# Patient Record
Sex: FEMALE | Race: WHITE | Employment: UNEMPLOYED | URBAN - METROPOLITAN AREA
[De-identification: names, ages, dates, MRNs, and addresses within clinical notes are randomized per-mention and may not be internally consistent; named-entity substitution may affect disease eponyms.]

---
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
## 2020-01-09 ENCOUNTER — TRANSFERRED RECORDS (OUTPATIENT)
Dept: HEALTH INFORMATION MANAGEMENT | Facility: CLINIC | Age: 20
End: 2020-01-09

## 2020-01-23 ENCOUNTER — TRANSFERRED RECORDS (OUTPATIENT)
Dept: HEALTH INFORMATION MANAGEMENT | Facility: CLINIC | Age: 20
End: 2020-01-23

## 2020-01-31 ENCOUNTER — TRANSCRIBE ORDERS (OUTPATIENT)
Dept: OTHER | Age: 20
End: 2020-01-31

## 2020-01-31 ENCOUNTER — TELEPHONE (OUTPATIENT)
Dept: ONCOLOGY | Facility: CLINIC | Age: 20
End: 2020-01-31

## 2020-01-31 DIAGNOSIS — N85.02 ENDOMETRIAL HYPERPLASIA WITH ATYPIA: Primary | ICD-10-CM

## 2020-01-31 NOTE — TELEPHONE ENCOUNTER
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 yes    Has patient been seen for any external appt for this diagnosis? yes    If yes, where? [facility]    Has patient had any imaging or procedures outside of [place] for this condition? Yes      If Yes, where? [facility]    ADDITIONAL INFORMATION:  LVM and sending letter

## 2020-02-06 ENCOUNTER — TELEPHONE (OUTPATIENT)
Dept: ONCOLOGY | Facility: CLINIC | Age: 20
End: 2020-02-06

## 2020-02-13 NOTE — TELEPHONE ENCOUNTER
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 yes     Has patient been seen for any external appt for this diagnosis? yes     If yes, where? [facility]     Has patient had any imaging or procedures outside of [place] for this condition? Yes                                If Yes, where? [facility]     ADDITIONAL INFORMATION:

## 2020-02-14 NOTE — TELEPHONE ENCOUNTER
Action    Action Taken 2/14/20: Recs from [facility] rec'd, faxed to HIM for upload  8:24 AM

## 2020-02-14 NOTE — TELEPHONE ENCOUNTER
RECORDS STATUS - ALL OTHER DIAGNOSIS      RECORDS RECEIVED FROM: [facility]   DATE RECEIVED: 2/26/2020   NOTES STATUS DETAILS   OFFICE NOTE from referring provider Complete  Huang Gutierres MD   OFFICE NOTE from medical oncologist N/A    DISCHARGE SUMMARY from hospital N/A    DISCHARGE REPORT from the ER     OPERATIVE REPORT     MEDICATION LIST Complete [facility]   CLINICAL TRIAL TREATMENTS TO DATE     LABS     PATHOLOGY REPORTS Requested Bx slides from [facility]   [place] PH: [phone]  [facility]- Case: [id number] Endometrial Curettings     ANYTHING RELATED TO DIAGNOSIS Complete   [facility]- Lab Report in [facility]   GENONOMIC TESTING     TYPE:     IMAGING (NEED IMAGES & REPORT)     CT SCANS     MRI     MAMMO     ULTRASOUND     PET       Action    Action Taken 2/14/2020 8:43am     I called pt Sandy- her phone went to .     I called [facility] ph: [phone]- I left a detailed vm for the  [facility].

## 2020-02-26 ENCOUNTER — PRE VISIT (OUTPATIENT)
Dept: ONCOLOGY | Facility: CLINIC | Age: 20
End: 2020-02-26

## 2020-03-02 ENCOUNTER — HEALTH MAINTENANCE LETTER (OUTPATIENT)
Age: 20
End: 2020-03-02

## 2020-03-10 NOTE — PROGRESS NOTES
GYNECOLOGIC  ONCOLOGY CONSULT          Huang Gutierres  [facility]  [address] [place] [address]   RE: Sandy Freed  : 2000  DIMITRIS: 3/11/2020    CC: Complex Endometrial Hyperplasia without Atypia    HPI: Ms Sandy Freed is a 20 year old G0 female who presents for consultation regarding complex endometrial without atypia.    She presents with her mother for this consult. Overall they report they were happy with the care they received with Dr. Gutierres.    Patient was seen for a new patient visit in , and reported long standing abnormal vaginal bleeding. She reports past history of irregular cycles with menses few times a year, followed by continuous bleeding in 2019. Reports excess hair since young age.     She has had issue with weight management since childhood, having previously attended adolescent weight clinic. She is doing better now with calorie counting and has lost 35 lbs in the last year.    She reports her bleeding has decreased since having the IUD. She is not sexually active.    Gyn History:  Onset of menses at age of 15, irregular  Normal sexual development  OCP's for 1-2 months in  of 2020 Placement of Mirena IUD    Treatment History  20 Pelvic US  FINDINGS:  Uterus: 6.8 centimeter x 5.8 centimeter x 3.6 centimeter normal echotexture of the myometrium.  No masses. Possible septated uterine removed.     Endometrium: Transvaginal imaging was performed to better evaluate the endometrium. 13 mm in thickness.  No sign of endometrial mass or fluid.      Right ovary: 2.6 centimeter x 1.9 centimeter x 2.1 centimeter. No ovarian or adnexal masses.  Normal arterial and venous blood flow.      Left ovary: 2.7 centimeter x 1.8 centimeter x 3.1 centimeter no ovarian or adnexal masses.  Normal arterial and venous blood flow.      Cul-de-sac: No significant free fluid.        IMPRESSION:  Unremarkable pelvic ultrasound.    20- Hysteroscopy, D&C and  Mirena IUD placement  Pathology- Complex Endometrial Hyperplasia without Atypia    OBGYN history and Health Maintenance:  G0  Last Pap Smear: Completed HPV Vaccine 3 doses 4/2017      Review of Systems:  Systemic:No weight changes.    Skin : No skin changes or new lesions.   Eye : No changes in vision.   Pulmonary: No cough or SOB.   Cardiovascular: No CP or palpitations  Gastrointestinal : No diarrhea, constipation, abdominal pain. Bowel habits normal.   Genitourinary: No dysuria, urgency. + vaginal bleeding  Psychiatric: No depression or anxiety  Hematologic : No palpable lymph nodes.   Endocrine : No hot flashes. No heat/cold intolerance.      Neurological: No headaches, no numbness.     Past Medical History:   Diagnosis Date     Anxiety     She does have anxiety.  She started seeing a counselor in the fall of 2014 weekly sessions.  She was taking Prozac for about 1-1.5 yrs and found it helpful.  However she stopped taking it about 1 yr ago due to non compliance and is now back on it.     Obesity        Past Surgical History:   Procedure Laterality Date     NO HISTORY OF SURGERY            Current Outpatient Medications   Medication Sig Dispense Refill     atorvastatin (LIPITOR) 40 MG tablet Take 1 tablet (40 mg) by mouth daily 30 tablet 3     blood glucose (ONE TOUCH VERIO IQ) test strip Use to test blood sugar 2 times daily or as directed. 1 Month 6     dulaglutide (TRULICITY) 0.75 MG/0.5ML pen Inject 0.75 mg Subcutaneous every 7 days (Patient not taking: Reported on 1/18/2018) 2 mL 1     enalapril (VASOTEC) 5 MG tablet Take 1 tablet (5 mg) by mouth daily 90 tablet 0     FLUoxetine (PROZAC) 20 MG capsule Take 3 capsules (60 mg) by mouth daily (Patient not taking: Reported on 1/18/2018) 90 capsule 1     ONETOUCH DELICA LANCETS 33G MISC 2 each daily 100 each 6     topiramate (TOPAMAX) 100 MG tablet Take 1.5 tablets (150 mg) by mouth daily 60 tablet 1     vitamin D (ERGOCALCIFEROL) 77674 UNIT capsule Take 1  "capsule (50,000 Units) by mouth every 7 days 8 capsule 0         No Known Allergies    Social History:  Social History     Tobacco Use     Smoking status: Passive Smoke Exposure - Never Smoker   Substance Use Topics     Alcohol use: No     Work: Student      Family History:   The patient's family history is notable for   Family History   Problem Relation Age of Onset     Prostate Cancer Maternal Grandfather      Breast Cancer Cousin         Paternal side     Breast Cancer Maternal Aunt         Maternal Great Aunt         Physical Exam:     BP (!) 158/103   Pulse 125   Temp 98.9  F (37.2  C) (Oral)   Resp 18   Ht 1.698 m (5' 6.85\")   Wt (!) 162.8 kg (359 lb)   LMP  (LMP Unknown)   SpO2 95%   Breastfeeding No   BMI 56.48 kg/m    Body mass index is 56.48 kg/m .    General: Alert and oriented, no acute distress  Psych: Mood stable      Assessment:    Sandy Freed is a 20 year old woman with a new diagnosis of complex hyperplasia without atypia, currently with Mirena IUD.  Obesity  Ovulatory Dysfunction    Plan:     1.)   I reviewed recent imaging and biopsy result and discussed them in detail with patient and her family. Work up and management to date is appropriate in this 20 yrs old nulliparous patient who wants to preserve fertility.      I reviewed the plan is to continue active management to reduce excess estrogen exposure to uterus. Options for treatment with Mirena IUD is the most effective and best tolerated for delivery direct of progesterone to the uterus. This should both reduce symptoms of bleeding and ideally reverse the hyperplasia of the endometrium. The recommendation is to follow up for endometrial biopsy in 6 months and repeat every 6 months until endometrium normalizes. Subsequently we encourage patients to continue with progesterone treatment until such time she is ready to attempt a pregnancy.      If there is progression of the endometrial hyperplasia, we would like to see the patient " return for consult.          2.) Ovulatory Dysfunction- reviewed her symptoms are likely associated with polycystic ovarian syndrome. Metformin can be used for it's antiproliferative effect and ability to reduce insulin resistance. Patient had prior experience with side effect of diarrhea. I encouraged her to discuss this further with weight management clinic.        3.) Obesity- she will continue to follow up with her counselor. She needs to have follow up at weight management clinic for medical management due to risk of developing endometrial cancer. In addition she is planning to follow up with an exercise program.    She will follow up in 6 months with her primary Gyn clinic and our clinic in 1 year. Can return sooner with any worsening symptoms.           Niki Guardado M.D., MPH,  F.A.C.O.G.  Division of Gynecologic Oncology        A total of 60 minutes was spent with the patient, > 50% of time was  minutes  spent in counseling the patient and/or treatment planning.

## 2020-03-11 ENCOUNTER — ONCOLOGY VISIT (OUTPATIENT)
Dept: ONCOLOGY | Facility: CLINIC | Age: 20
End: 2020-03-11
Attending: OBSTETRICS & GYNECOLOGY
Payer: COMMERCIAL

## 2020-03-11 VITALS
DIASTOLIC BLOOD PRESSURE: 103 MMHG | HEART RATE: 125 BPM | RESPIRATION RATE: 18 BRPM | BODY MASS INDEX: 45.99 KG/M2 | OXYGEN SATURATION: 95 % | HEIGHT: 67 IN | TEMPERATURE: 98.9 F | WEIGHT: 293 LBS | SYSTOLIC BLOOD PRESSURE: 158 MMHG

## 2020-03-11 DIAGNOSIS — N85.01 COMPLEX ENDOMETRIAL HYPERPLASIA: Primary | ICD-10-CM

## 2020-03-11 PROCEDURE — 99205 OFFICE O/P NEW HI 60 MIN: CPT | Mod: ZP | Performed by: OBSTETRICS & GYNECOLOGY

## 2020-03-11 PROCEDURE — G0463 HOSPITAL OUTPT CLINIC VISIT: HCPCS | Mod: ZF

## 2020-03-11 RX ORDER — ACETAMINOPHEN 160 MG
TABLET,DISINTEGRATING ORAL
COMMUNITY

## 2020-03-11 ASSESSMENT — MIFFLIN-ST. JEOR: SCORE: 2428.65

## 2020-03-11 ASSESSMENT — PAIN SCALES - GENERAL: PAINLEVEL: NO PAIN (0)

## 2020-03-11 NOTE — LETTER
3/11/2020       RE: Sandy Freed  [address] [address]     Dear Colleague,    Thank you for referring your patient, Sandy Freed, to the [facility]. Please see a copy of my visit note below.    GYNECOLOGIC  ONCOLOGY CONSULT          Huang Gutierres  [facility]  [address]   [place] [address]   RE: Sandy Freed  : 2000  DIMITRIS: 3/11/2020    CC: Complex Endometrial Hyperplasia without Atypia    HPI: Ms Sandy Freed is a 20 year old G0 female who presents for consultation regarding complex endometrial without atypia.    She presents with her mother for this consult. Overall they report they were happy with the care they received with Dr. Gutierres.    Patient was seen for a new patient visit in , and reported long standing abnormal vaginal bleeding. She reports past history of irregular cycles with menses few times a year, followed by continuous bleeding in . Reports excess hair since young age.     She has had issue with weight management since childhood, having previously attended adolescent weight clinic. She is doing better now with calorie counting and has lost 35 lbs in the last year.    She reports her bleeding has decreased since having the IUD. She is not sexually active.    Gyn History:  Onset of menses at age of 15, irregular  Normal sexual development  OCP's for 1-2 months in  of 2020 Placement of Mirena IUD    Treatment History  20 Pelvic US  FINDINGS:  Uterus: 6.8 centimeter x 5.8 centimeter x 3.6 centimeter normal echotexture of the myometrium.  No masses. Possible septated uterine removed.     Endometrium: Transvaginal imaging was performed to better evaluate the endometrium. 13 mm in thickness.  No sign of endometrial mass or fluid.      Right ovary: 2.6 centimeter x 1.9 centimeter x 2.1 centimeter. No ovarian or adnexal masses.  Normal arterial and venous blood flow.      Left ovary: 2.7 centimeter x 1.8  centimeter x 3.1 centimeter no ovarian or adnexal masses.  Normal arterial and venous blood flow.      Cul-de-sac: No significant free fluid.        IMPRESSION:  Unremarkable pelvic ultrasound.    1/9/20- Hysteroscopy, D&C and Mirena IUD placement  Pathology- Complex Endometrial Hyperplasia without Atypia    OBGYN history and Health Maintenance:  G0  Last Pap Smear: Completed HPV Vaccine 3 doses 4/2017      Review of Systems:  Systemic:No weight changes.    Skin : No skin changes or new lesions.   Eye : No changes in vision.   Pulmonary: No cough or SOB.   Cardiovascular: No CP or palpitations  Gastrointestinal : No diarrhea, constipation, abdominal pain. Bowel habits normal.   Genitourinary: No dysuria, urgency. + vaginal bleeding  Psychiatric: No depression or anxiety  Hematologic : No palpable lymph nodes.   Endocrine : No hot flashes. No heat/cold intolerance.      Neurological: No headaches, no numbness.     Past Medical History:   Diagnosis Date     Anxiety     She does have anxiety.  She started seeing a counselor in the fall of 2014 weekly sessions.  She was taking Prozac for about 1-1.5 yrs and found it helpful.  However she stopped taking it about 1 yr ago due to non compliance and is now back on it.     Obesity        Past Surgical History:   Procedure Laterality Date     NO HISTORY OF SURGERY            Current Outpatient Medications   Medication Sig Dispense Refill     atorvastatin (LIPITOR) 40 MG tablet Take 1 tablet (40 mg) by mouth daily 30 tablet 3     blood glucose (ONE TOUCH VERIO IQ) test strip Use to test blood sugar 2 times daily or as directed. 1 Month 6     dulaglutide (TRULICITY) 0.75 MG/0.5ML pen Inject 0.75 mg Subcutaneous every 7 days (Patient not taking: Reported on 1/18/2018) 2 mL 1     enalapril (VASOTEC) 5 MG tablet Take 1 tablet (5 mg) by mouth daily 90 tablet 0     FLUoxetine (PROZAC) 20 MG capsule Take 3 capsules (60 mg) by mouth daily (Patient not taking: Reported on 1/18/2018) 90  "capsule 1     ONETOUCH DELICA LANCETS 33G MISC 2 each daily 100 each 6     topiramate (TOPAMAX) 100 MG tablet Take 1.5 tablets (150 mg) by mouth daily 60 tablet 1     vitamin D (ERGOCALCIFEROL) 28595 UNIT capsule Take 1 capsule (50,000 Units) by mouth every 7 days 8 capsule 0         No Known Allergies    Social History:  Social History     Tobacco Use     Smoking status: Passive Smoke Exposure - Never Smoker   Substance Use Topics     Alcohol use: No     Work: Student      Family History:   The patient's family history is notable for   Family History   Problem Relation Age of Onset     Prostate Cancer Maternal Grandfather      Breast Cancer Cousin         Paternal side     Breast Cancer Maternal Aunt         Maternal Great Aunt         Physical Exam:     BP (!) 158/103   Pulse 125   Temp 98.9  F (37.2  C) (Oral)   Resp 18   Ht 1.698 m (5' 6.85\")   Wt (!) 162.8 kg (359 lb)   LMP  (LMP Unknown)   SpO2 95%   Breastfeeding No   BMI 56.48 kg/m    Body mass index is 56.48 kg/m .    General: Alert and oriented, no acute distress  Psych: Mood stable      Assessment:    Sandy Freed is a 20 year old woman with a new diagnosis of complex hyperplasia without atypia, currently with Mirena IUD.  Obesity  Ovulatory Dysfunction    Plan:     1.)   I reviewed recent imaging and biopsy result and discussed them in detail with patient and her family. Work up and management to date is appropriate in this 20 yrs old nulliparous patient who wants to preserve fertility.      I reviewed the plan is to continue active management to reduce excess estrogen exposure to uterus. Options for treatment with Mirena IUD is the most effective and best tolerated for delivery direct of progesterone to the uterus. This should both reduce symptoms of bleeding and ideally reverse the hyperplasia of the endometrium. The recommendation is to follow up for endometrial biopsy in 6 months and repeat every 6 months until endometrium normalizes. " Subsequently we encourage patients to continue with progesterone treatment until such time she is ready to attempt a pregnancy.      If there is progression of the endometrial hyperplasia, we would like to see the patient return for consult.          2.) Ovulatory Dysfunction- reviewed her symptoms are likely associated with polycystic ovarian syndrome. Metformin can be used for it's antiproliferative effect and ability to reduce insulin resistance. Patient had prior experience with side effect of diarrhea. I encouraged her to discuss this further with weight management clinic.        3.) Obesity- she will continue to follow up with her counselor. She needs to have follow up at weight management clinic for medical management due to risk of developing endometrial cancer. In addition she is planning to follow up with an exercise program.    She will follow up in 6 months with her primary Gyn clinic and our clinic in 1 year. Can return sooner with any worsening symptoms.           Niki Guardado M.D., MPH,  F.A.C.O.G.  Division of Gynecologic Oncology        A total of 60 minutes was spent with the patient, > 50% of time was  minutes  spent in counseling the patient and/or treatment planning.        Again, thank you for allowing me to participate in the care of your patient.      Sincerely,    Niki Guardado MD

## 2020-03-11 NOTE — NURSING NOTE
"Oncology Rooming Note    March 11, 2020 10:32 AM   Sandy Freed is a 20 year old female who presents for:    Chief Complaint   Patient presents with     Oncology Clinic Visit     New;  Complex endometrial hyperplasia without atypia     Initial Vitals: BP (!) 141/105   Pulse 125   Temp 98.9  F (37.2  C) (Oral)   Resp 18   Ht 1.698 m (5' 6.85\")   Wt (!) 162.8 kg (359 lb)   LMP  (LMP Unknown)   SpO2 95%   Breastfeeding No   BMI 56.48 kg/m   Estimated body mass index is 56.48 kg/m  as calculated from the following:    Height as of this encounter: 1.698 m (5' 6.85\").    Weight as of this encounter: 162.8 kg (359 lb). Body surface area is 2.77 meters squared.  No Pain (0) Comment: Data Unavailable   No LMP recorded (lmp unknown). (Menstrual status: IUD).  Allergies reviewed: Yes  Medications reviewed: Yes    Medications: Medication refills not needed today.  Pharmacy name entered into EPIC: [id number] - [facility] [place] [address] [facility]    Clinical concerns: Endometrial hyperplasia       Gely Reis CMA              "

## 2020-12-20 ENCOUNTER — HEALTH MAINTENANCE LETTER (OUTPATIENT)
Age: 20
End: 2020-12-20

## 2021-04-24 ENCOUNTER — HEALTH MAINTENANCE LETTER (OUTPATIENT)
Age: 21
End: 2021-04-24

## 2021-08-08 ENCOUNTER — HEALTH MAINTENANCE LETTER (OUTPATIENT)
Age: 21
End: 2021-08-08

## 2021-10-03 ENCOUNTER — HEALTH MAINTENANCE LETTER (OUTPATIENT)
Age: 21
End: 2021-10-03

## 2022-03-20 ENCOUNTER — HEALTH MAINTENANCE LETTER (OUTPATIENT)
Age: 22
End: 2022-03-20

## 2022-05-15 ENCOUNTER — HEALTH MAINTENANCE LETTER (OUTPATIENT)
Age: 22
End: 2022-05-15

## 2022-09-10 ENCOUNTER — HEALTH MAINTENANCE LETTER (OUTPATIENT)
Age: 22
End: 2022-09-10

## 2023-01-22 ENCOUNTER — HEALTH MAINTENANCE LETTER (OUTPATIENT)
Age: 23
End: 2023-01-22

## 2023-06-03 ENCOUNTER — HEALTH MAINTENANCE LETTER (OUTPATIENT)
Age: 23
End: 2023-06-03

## 2023-07-23 ENCOUNTER — HEALTH MAINTENANCE LETTER (OUTPATIENT)
Age: 23
End: 2023-07-23

## 2024-02-18 ENCOUNTER — HEALTH MAINTENANCE LETTER (OUTPATIENT)
Age: 24
End: 2024-02-18

## 2024-03-11 ENCOUNTER — NURSE TRIAGE (OUTPATIENT)
Dept: NURSING | Facility: CLINIC | Age: 24
End: 2024-03-11
Payer: COMMERCIAL

## 2024-03-12 NOTE — TELEPHONE ENCOUNTER
Pt asked if she can take Ibuprofen with Sertraline. Used Up to Date resource for guidance.  Per up to date, pt should Consider therapy modification. Pt asked about Tylenol usage and maximum per day.  Given dosage recommendations. Pt declined triage.       Reason for Disposition   Caller has medicine question, adult has minor symptoms, caller declines triage, AND triager answers question    Protocols used: Medication Question Call-A-

## 2024-03-12 NOTE — TELEPHONE ENCOUNTER
Patient broke her foot on Saturday and is wondering how much ibuprofen is recommended to take in a 24 hour period.  Epic's adult pain dosage table referenced for information.  No triage.  Patient verbalized understanding the information.    Reason for Disposition   Caller has medicine question only, adult not sick, AND triager answers question    Additional Information   Negative: [1] Intentional drug overdose AND [2] suicidal thoughts or ideas   Negative: MORE THAN A DOUBLE DOSE of a prescription or over-the-counter (OTC) drug   Negative: [1] DOUBLE DOSE (an extra dose or lesser amount) of prescription drug AND [2] any symptoms (e.g., dizziness, nausea, pain, sleepiness)   Negative: [1] DOUBLE DOSE (an extra dose or lesser amount) of over-the-counter (OTC) drug AND [2] any symptoms (e.g., dizziness, nausea, pain, sleepiness)   Negative: Took another person's prescription drug   Negative: [1] DOUBLE DOSE (an extra dose or lesser amount) of prescription drug AND [2] NO symptoms  (Exception: A double dose of antibiotics.)   Negative: Diabetes drug error or overdose (e.g., took wrong type of insulin or took extra dose)   Negative: [1] Prescription not at pharmacy AND [2] was prescribed by PCP recently (Exception: Triager has access to EMR and prescription is recorded there. Go to Home Care and confirm for pharmacy.)   Negative: [1] Pharmacy calling with prescription question AND [2] triager unable to answer question   Negative: [1] Caller has URGENT medicine question about med that PCP or specialist prescribed AND [2] triager unable to answer question   Negative: Medicine patch causing local rash or itching   Negative: [1] Caller has medicine question about med NOT prescribed by PCP AND [2] triager unable to answer question (e.g., compatibility with other med, storage)   Negative: Prescription request for new medicine (not a refill)   Negative: [1] Caller has NON-URGENT medicine question about med that PCP prescribed  AND [2] triager unable to answer question   Negative: Caller wants to use a complementary or alternative medicine   Negative: [1] Prescription prescribed recently is not at pharmacy AND [2] triager has access to patient's EMR AND [3] prescription is recorded in the EMR   Negative: [1] DOUBLE DOSE (an extra dose or lesser amount) of over-the-counter (OTC) drug AND [2] NO symptoms   Negative: [1] DOUBLE DOSE (an extra dose or lesser amount) of antibiotic drug AND [2] NO symptoms    Protocols used: Medication Question Call-A-AH